# Patient Record
Sex: MALE | Race: WHITE | NOT HISPANIC OR LATINO | Employment: OTHER | URBAN - METROPOLITAN AREA
[De-identification: names, ages, dates, MRNs, and addresses within clinical notes are randomized per-mention and may not be internally consistent; named-entity substitution may affect disease eponyms.]

---

## 2017-06-09 ENCOUNTER — TELEPHONE (OUTPATIENT)
Dept: SMOKING CESSATION | Facility: CLINIC | Age: 67
End: 2017-06-09

## 2017-06-12 ENCOUNTER — TELEPHONE (OUTPATIENT)
Dept: SMOKING CESSATION | Facility: CLINIC | Age: 67
End: 2017-06-12

## 2017-06-13 ENCOUNTER — CLINICAL SUPPORT (OUTPATIENT)
Dept: SMOKING CESSATION | Facility: CLINIC | Age: 67
End: 2017-06-13
Payer: COMMERCIAL

## 2017-06-13 ENCOUNTER — TELEPHONE (OUTPATIENT)
Dept: SMOKING CESSATION | Facility: CLINIC | Age: 67
End: 2017-06-13

## 2017-06-13 DIAGNOSIS — F17.200 NICOTINE DEPENDENCE: Primary | ICD-10-CM

## 2017-06-13 PROCEDURE — 99407 BEHAV CHNG SMOKING > 10 MIN: CPT | Mod: S$GLB,,,

## 2017-06-13 NOTE — TELEPHONE ENCOUNTER
3rd attempt; 1 year telephone follow up regarding smoking cessation quit episode #1. Will resolve this episode.

## 2018-04-04 ENCOUNTER — HISTORICAL (OUTPATIENT)
Dept: CARDIOLOGY | Facility: HOSPITAL | Age: 68
End: 2018-04-04

## 2022-08-25 DIAGNOSIS — G45.9 TIA (TRANSIENT ISCHEMIC ATTACK): Primary | ICD-10-CM

## 2022-09-12 ENCOUNTER — HOSPITAL ENCOUNTER (OUTPATIENT)
Dept: CARDIOLOGY | Facility: HOSPITAL | Age: 72
Discharge: HOME | End: 2022-09-12
Payer: MEDICARE

## 2022-09-12 DIAGNOSIS — R42 DIZZINESS AND GIDDINESS: Primary | ICD-10-CM

## 2022-09-12 DIAGNOSIS — R42 DIZZINESS AND GIDDINESS: ICD-10-CM

## 2022-09-12 PROCEDURE — 93225 XTRNL ECG REC<48 HRS REC: CPT

## 2022-09-15 LAB
OHS CV EVENT MONITOR DAY: 0
OHS CV HOLTER LENGTH DECIMAL HOURS: 24
OHS CV HOLTER LENGTH HOURS: 24
OHS CV HOLTER LENGTH MINUTES: 0
OHS CV HOLTER SINUS AVERAGE HR: 65
OHS CV HOLTER SINUS MAX HR: 122
OHS CV HOLTER SINUS MIN HR: 47

## 2022-09-15 PROCEDURE — 93227 XTRNL ECG REC<48 HR R&I: CPT | Mod: ,,, | Performed by: INTERNAL MEDICINE

## 2022-09-15 PROCEDURE — 93227 HOLTER MONITOR - 24 HOUR (CUPID ONLY): ICD-10-PCS | Mod: ,,, | Performed by: INTERNAL MEDICINE

## 2022-09-29 DIAGNOSIS — I48.0 PAROXYSMAL ATRIAL FIBRILLATION: Primary | ICD-10-CM

## 2022-10-10 ENCOUNTER — HOSPITAL ENCOUNTER (OUTPATIENT)
Dept: CARDIOLOGY | Facility: HOSPITAL | Age: 72
Discharge: HOME | End: 2022-10-10
Attending: INTERNAL MEDICINE
Payer: MEDICARE

## 2022-10-10 DIAGNOSIS — I48.0 PAROXYSMAL ATRIAL FIBRILLATION: ICD-10-CM

## 2022-10-10 LAB
AV INDEX (PROSTH): 0.51
AV MEAN GRADIENT: 3 MMHG
AV PEAK GRADIENT: 7 MMHG
AV VALVE AREA: 1.76 CM2
AV VELOCITY RATIO: 0.49
CV ECHO LV RWT: 0.33 CM
DOP CALC AO PEAK VEL: 1.3 M/S
DOP CALC AO VTI: 28.6 CM
DOP CALC LVOT AREA: 3.5 CM2
DOP CALC LVOT DIAMETER: 2.1 CM
DOP CALC LVOT PEAK VEL: 0.64 M/S
DOP CALC LVOT STROKE VOLUME: 50.2 CM3
DOP CALC MV VTI: 34.5 CM
DOP CALCLVOT PEAK VEL VTI: 14.5 CM
E WAVE DECELERATION TIME: 203 MSEC
E/A RATIO: 2.14
E/E' RATIO: 8.56 M/S
ECHO LV POSTERIOR WALL: 0.88 CM (ref 0.6–1.1)
EJECTION FRACTION: 55 %
FRACTIONAL SHORTENING: 29 % (ref 28–44)
INTERVENTRICULAR SEPTUM: 1.1 CM (ref 0.6–1.1)
LEFT ATRIUM SIZE: 3 CM
LEFT ATRIUM VOLUME MOD: 67.1 CM3
LEFT INTERNAL DIMENSION IN SYSTOLE: 3.8 CM (ref 2.1–4)
LEFT VENTRICLE DIASTOLIC VOLUME: 140 ML
LEFT VENTRICLE SYSTOLIC VOLUME: 62 ML
LEFT VENTRICULAR INTERNAL DIMENSION IN DIASTOLE: 5.38 CM (ref 3.5–6)
LEFT VENTRICULAR MASS: 202.75 G
LV LATERAL E/E' RATIO: 7.7 M/S
LV SEPTAL E/E' RATIO: 9.63 M/S
LVOT MG: 1 MMHG
LVOT MV: 0.4 CM/S
MV MEAN GRADIENT: 1 MMHG
MV PEAK A VEL: 0.36 M/S
MV PEAK E VEL: 0.77 M/S
MV PEAK GRADIENT: 5 MMHG
MV STENOSIS PRESSURE HALF TIME: 76 MS
MV VALVE AREA BY CONTINUITY EQUATION: 1.45 CM2
MV VALVE AREA P 1/2 METHOD: 2.89 CM2
PV PEAK VELOCITY: 0.87 CM/S
RA PRESSURE: 3 MMHG
RIGHT VENTRICULAR END-DIASTOLIC DIMENSION: 3.2 CM
TDI LATERAL: 0.1 M/S
TDI SEPTAL: 0.08 M/S
TDI: 0.09 M/S
TRICUSPID ANNULAR PLANE SYSTOLIC EXCURSION: 2.96 CM

## 2022-10-10 PROCEDURE — 93306 ECHO (CUPID ONLY): ICD-10-PCS | Mod: 26,,, | Performed by: INTERNAL MEDICINE

## 2022-10-10 PROCEDURE — 93306 TTE W/DOPPLER COMPLETE: CPT | Mod: 26,,, | Performed by: INTERNAL MEDICINE

## 2022-10-10 PROCEDURE — 93306 TTE W/DOPPLER COMPLETE: CPT

## 2023-09-27 DIAGNOSIS — R41.89 COGNITIVE IMPAIRMENT: Primary | ICD-10-CM

## 2023-10-17 ENCOUNTER — OFFICE VISIT (OUTPATIENT)
Dept: NEUROLOGY | Facility: CLINIC | Age: 73
End: 2023-10-17
Payer: MEDICARE

## 2023-10-17 VITALS
DIASTOLIC BLOOD PRESSURE: 80 MMHG | HEIGHT: 72 IN | SYSTOLIC BLOOD PRESSURE: 126 MMHG | WEIGHT: 187 LBS | HEART RATE: 66 BPM | BODY MASS INDEX: 25.33 KG/M2

## 2023-10-17 DIAGNOSIS — G45.9 TIA (TRANSIENT ISCHEMIC ATTACK): Primary | ICD-10-CM

## 2023-10-17 DIAGNOSIS — G45.4 TRANSIENT GLOBAL AMNESIA: ICD-10-CM

## 2023-10-17 PROCEDURE — 99999 PR PBB SHADOW E&M-EST. PATIENT-LVL III: ICD-10-PCS | Mod: PBBFAC,,, | Performed by: PSYCHIATRY & NEUROLOGY

## 2023-10-17 PROCEDURE — 99205 PR OFFICE/OUTPT VISIT, NEW, LEVL V, 60-74 MIN: ICD-10-PCS | Mod: S$PBB,,, | Performed by: PSYCHIATRY & NEUROLOGY

## 2023-10-17 PROCEDURE — 99213 OFFICE O/P EST LOW 20 MIN: CPT | Mod: PBBFAC | Performed by: PSYCHIATRY & NEUROLOGY

## 2023-10-17 PROCEDURE — 99999 PR PBB SHADOW E&M-EST. PATIENT-LVL III: CPT | Mod: PBBFAC,,, | Performed by: PSYCHIATRY & NEUROLOGY

## 2023-10-17 PROCEDURE — 99205 OFFICE O/P NEW HI 60 MIN: CPT | Mod: S$PBB,,, | Performed by: PSYCHIATRY & NEUROLOGY

## 2023-10-17 RX ORDER — LOSARTAN POTASSIUM 25 MG/1
25 TABLET ORAL DAILY
COMMUNITY

## 2023-10-17 RX ORDER — FLECAINIDE ACETATE 50 MG/1
50 TABLET ORAL EVERY 12 HOURS
COMMUNITY

## 2023-10-17 RX ORDER — VILAZODONE HYDROCHLORIDE 40 MG/1
40 TABLET ORAL DAILY
COMMUNITY

## 2023-10-17 NOTE — PROGRESS NOTES
Chief Complaint   Patient presents with    Cognitive impairment     NP: Referred by Dr. Michel Jackson for neuro consult to evaluate for cognitive impaiment: About a year ago started having memory problems. Misplaces objects, forgets code to house. Forgets time loss. 3 weeks ago was crabbing in [facility]. He caught a bad headache. He walking in a daze. Police was called. Averages 5-6 hours of sleep at night. Sometimes he does get frustrated when he cannot remember. Gets spells where he cannot verbalize, but he can hear conversation last a few minutes.         This is a 73 y.o. male  here for episodic confusion.  Patient is here today with his wife.  Patient is a retired dentist.  He has had 3 separate episodes with episodic language issues or confusion.  The 1st episode occurred in September of 2020.  On this episode he was at the camp and granddaughter and had 15 minutes of difficulty with expressive language.  His symptoms resolved while on the way to the hospital.  Notably he had stopped his blood pressure medicine in his blood pressure was noted to be 210/100 at the time.  Underwent a workup and was diagnosed with possible TIA at [facility].  He was started on aspirin and Plavix at that time.  In 2021 the patient had another episode of confusion after having worked out on the treadmill and swim and then coming home.  He forgot the code to get in his house.  Once his wife helped him in his symptoms had resolved.  Around the same time he was diagnosed with paroxysmal atrial fib by Dr. Salazar.  At that time he was started on Xarelto and flecainide.  The 3rd episode occurred on September 22nd.  Patient was out of town cramping with his 11-year-old grandson.  They were staying at a hotel.  The patient left to get something across the street at the store but then became confused and was wandering around the parking lot.  EMS was called for the patient and he was very confused and disoriented.  He had forgot that he was  there with his grandson.  This lasted for several hours until 1:00 a.m. that morning patient's wife still noticed him to be confused.  He underwent an MRI of the brain which I reviewed on 09/27/2023 which showed no acute abnormality with chronic microvascular ischemic changes.        Medication List with Changes/Refills   Current Medications    AMLODIPINE (NORVASC) 10 MG TABLET    Take 10 mg by mouth once daily.    FLECAINIDE (TAMBOCOR) 50 MG TAB    Take 50 mg by mouth every 12 (twelve) hours.    LISINOPRIL (PRINIVIL,ZESTRIL) 20 MG TABLET    Take 20 mg by mouth once daily.    LOSARTAN (COZAAR) 25 MG TABLET    Take 25 mg by mouth once daily.    NICOTINE, POLACRILEX, (NICORETTE) 2 MG GUM    Take 1 each (2 mg total) by mouth as needed.    RIVAROXABAN (XARELTO) 20 MG TAB    Take 20 mg by mouth daily with dinner or evening meal.    SILDENAFIL (REVATIO) 20 MG TAB    Take 20 mg by mouth 3 (three) times daily.    TAMSULOSIN (FLOMAX) 0.4 MG CP24    Take 0.4 mg by mouth once daily.    TEMAZEPAM (RESTORIL) 15 MG CAP    Take 15 mg by mouth nightly as needed.    VARENICLINE (CHANTIX) 1 MG TAB    Take 1 tablet (1 mg total) by mouth 2 (two) times daily.    VILAZODONE (VIIBRYD) 40 MG TAB TABLET    Take 40 mg by mouth once daily.        Past Surgical History:   Procedure Laterality Date    KNEE SURGERY Left 07/2023        Past Medical History:   Diagnosis Date    Anxiety     BPH (benign prostatic hyperplasia)     Depression     Hypertension         Family History   Problem Relation Age of Onset    Heart disease Mother     Heart disease Father     No Known Problems Sister     Hyperlipidemia Brother     Hypertension Brother     Peripheral vascular disease Brother     Diabetes Maternal Grandmother     Diabetes Paternal Grandfather     No Known Problems Sister     Ulcerative colitis Son     No Known Problems Son     No Known Problems Brother     No Known Problems Brother     Suicide Brother     No Known Problems Son         Social  History     Socioeconomic History    Marital status:    Tobacco Use    Smoking status: Former     Current packs/day: 0.00     Types: Cigarettes     Quit date: 2016     Years since quittin.6   Substance and Sexual Activity    Alcohol use: Not Currently    Drug use: Never          Review of Systems  Review of Systems   Constitutional: Negative for appetite change.   HENT: Negative for sinus pressure and sore throat.    Eyes: Negative for visual disturbance.   Respiratory: Negative for cough and shortness of breath.    Cardiovascular: Negative for chest pain.   Gastrointestinal: Negative for diarrhea and nausea.   Endocrine: Negative for cold intolerance and heat intolerance.   Genitourinary: Negative for dysuria.   Musculoskeletal: Negative for arthralgias and myalgias.   Skin: Negative for rash.   Allergic/Immunologic: Negative for immunocompromised state.   Neurological:        See HPI   Hematological: Does not bruise/bleed easily.   Psychiatric/Behavioral: Negative for hallucinations.      General: alert and oriented, no acute distress, no audible wheezes, pulse intact, no edema    Vitals:    10/17/23 1034   BP: 126/80   Pulse: 66        Cognition and Comprehension  Speech and language intact  Follows commands  Speech fluent  Attention intact  Memory for recent events intact from history taking  Affect pleasant  Fund of knowledge adequate    MOCA  missed 1/5 on STM recall    Cranial nerves  II. Optic: Visual fields full to confrontation both eyes  III, IV, VI. Oculomotor: Intact, Pupils equal, round and reactive to light, no nystagmus  V. Trigeminal: sensation to light touch normal  VII. No facial asymmetry or facial weakness  VIII. Hearing intact to spoken voice  IX/X. Glossopharyngeal/Vagus: Voice normal, palate rises symmetrically  XI. Axillary: Shoulder shrug normal  XII. Hypoglossal: Intact    Muscle Strength and Tone  Normal upper extremity tone  Normal lower extremity tone  Normal upper  extremity strength  Normal lower extremity strength    Sensation  Intact to light touch and temperature    Reflexes  Normal and symmetric    Coordination and Gait  Finger to nose normal  Gait normal       Abundio was seen today for cognitive impairment.    Diagnoses and all orders for this visit:    TIA (transient ischemic attack)  -     Vitamin B12; Future  -     EEG,w/awake & asleep record; Future  -     CTA Head and Neck (xpd); Future    Transient global amnesia  -     Echo Saline Bubble? Yes; Future  -     Echo Saline Bubble? Yes     Suspect TIA in first event, dehydration in second event, TGA as third event. Memory testing ok, but will follow that going forward. Rec continue xarelto, check CTA, EEG, echo, lipid panel, vit B12

## 2023-10-23 ENCOUNTER — TELEPHONE (OUTPATIENT)
Dept: NEUROLOGY | Facility: CLINIC | Age: 73
End: 2023-10-23

## 2023-10-23 ENCOUNTER — HOSPITAL ENCOUNTER (OUTPATIENT)
Dept: RADIOLOGY | Facility: HOSPITAL | Age: 73
Discharge: HOME | End: 2023-10-23
Attending: PSYCHIATRY & NEUROLOGY
Payer: MEDICARE

## 2023-10-23 DIAGNOSIS — G45.9 TIA (TRANSIENT ISCHEMIC ATTACK): ICD-10-CM

## 2023-10-23 LAB
CREAT SERPL-MCNC: 1.2 MG/DL (ref 0.5–1.4)
SAMPLE: NORMAL

## 2023-10-23 PROCEDURE — 70496 CT ANGIOGRAPHY HEAD: CPT | Mod: TC

## 2023-10-23 PROCEDURE — 25500020 PHARM REV CODE 255: Performed by: PSYCHIATRY & NEUROLOGY

## 2023-10-23 RX ADMIN — IOPAMIDOL 100 ML: 755 INJECTION, SOLUTION INTRAVENOUS at 10:10

## 2023-10-23 NOTE — TELEPHONE ENCOUNTER
----- Message from Suzanna Gotti MD sent at 10/23/2023  3:35 PM CDT -----  Please let him know CTA is normal

## 2023-10-24 ENCOUNTER — PROCEDURE VISIT (OUTPATIENT)
Dept: SLEEP MEDICINE | Facility: HOSPITAL | Age: 73
End: 2023-10-24
Attending: PSYCHIATRY & NEUROLOGY
Payer: MEDICARE

## 2023-10-24 DIAGNOSIS — G45.9 TIA (TRANSIENT ISCHEMIC ATTACK): ICD-10-CM

## 2023-10-24 PROCEDURE — 95819 EEG AWAKE AND ASLEEP: CPT

## 2023-10-26 PROCEDURE — 95816 EEG AWAKE AND DROWSY: CPT | Mod: 26,,, | Performed by: PSYCHIATRY & NEUROLOGY

## 2023-10-26 PROCEDURE — 95816 PR EEG,W/AWAKE & DROWSY RECORD: ICD-10-PCS | Mod: 26,,, | Performed by: PSYCHIATRY & NEUROLOGY

## 2023-10-26 NOTE — PROCEDURES
Abundio Castorena is a 73 y.o. male patient.            EEG,w/awake & asleep record    Date/Time: 10/26/2023 3:56 PM    Performed by: Suzanna Gotti MD  Authorized by: Suzanna Gotti MD          10/26/2023    Reason for exam: altered mental status    Technical description:  A standard digital EEG was performed at [facility].  The 10 20 international system of electrode placement is used.  Standard montages were recorded.  Activation procedures were performed.    Description:  No posterior dominant rhythm was identified.  The record was dominated by admixed polymorphic theta and delta activity stage 2 sleep was not identified.  There were no electrographic seizures or epileptiform discharges.    Impression:  This is an abnormal EEG due to mild generalized slowing.  Generalized slowing can be seen with generalized cerebral dysfunction as seen in metabolic, toxic, infectious, or multifocal structural abnormalities.

## 2023-10-30 ENCOUNTER — TELEPHONE (OUTPATIENT)
Dept: NEUROLOGY | Facility: CLINIC | Age: 73
End: 2023-10-30
Payer: MEDICARE

## 2023-10-30 NOTE — TELEPHONE ENCOUNTER
----- Message from Suzanna Gotti MD sent at 10/30/2023  4:21 PM CDT -----  B12 is normal, EEG is normal

## 2023-11-09 ENCOUNTER — TELEPHONE (OUTPATIENT)
Dept: NEUROLOGY | Facility: CLINIC | Age: 73
End: 2023-11-09
Payer: MEDICARE

## 2023-11-09 NOTE — TELEPHONE ENCOUNTER
Please see chart review and telephone encounter from 10/30. You called him with results on 10/30 as requested by me. Labs and EEG were normal.  There is no contraindication to driving based on his diagnosis

## 2023-11-09 NOTE — TELEPHONE ENCOUNTER
Requesting results of EEG. Wants to know if it is safe for him to drive being he had a 10 hour lapse of memory. Please advise.

## 2023-11-09 NOTE — TELEPHONE ENCOUNTER
Pt states calling to discuss results of B-12 blood levels, EEG and labs.   States he is concerned about diagnosis of transient global amnesia after a spell of unconsciousness.  States this was previously reported to the team.  States seeking a callback.

## 2023-11-13 NOTE — PROGRESS NOTES
His LDL is at 145. Generally  in the setting of TIA we want this below 100. Would recommend statin medication, lipitor unless he is already taking a statin

## 2023-11-14 ENCOUNTER — TELEPHONE (OUTPATIENT)
Dept: NEUROLOGY | Facility: CLINIC | Age: 73
End: 2023-11-14
Payer: MEDICARE

## 2023-11-14 NOTE — TELEPHONE ENCOUNTER
----- Message from Brenda Lima sent at 2023  1:46 PM CST -----  Regarding: returning call  To:          Office  From:        Abundio Castorena  Phone:       [phone]  Patient:     Same  :         1950  RegDr:      Dr Suzanna Gotti  Ref:         He is returning call from  office regarding tests.    Subject:          Patient Calls  ClrID:    [id number]    --------------------------------------  Message History  Account: [id number]  Taken:  2023 12:06p [facility]  Serial#: [id number]

## 2023-11-14 NOTE — TELEPHONE ENCOUNTER
Result of lab given. States he is not a big believer in statins. States he recently started on Metamucil. He wants to know how can he do or how can he prevent from having this spell ( transient global amnesia ) ?

## 2023-11-14 NOTE — TELEPHONE ENCOUNTER
----- Message from Suzanna Gotti MD sent at 11/13/2023  4:13 PM CST -----  His LDL is at 145. Generally  in the setting of TIA we want this below 100. Would recommend statin medication, lipitor unless he is already taking a statin

## 2023-11-14 NOTE — TELEPHONE ENCOUNTER
There is not much that can be done to prevent transient global amnesia. For TIA, we recommend optimizing any stroke risk factors to prevent future TIA. From his history he had a TIA in September 2020. That would mean taking his blood pressure medication, taking a daily ASA and treating his cholesterol as suggested with the statin.

## 2023-11-14 NOTE — TELEPHONE ENCOUNTER
Patient called returning Nurse Adri phone call about his results. Requesting a call back. Please advise.

## 2024-08-19 DIAGNOSIS — I66.9 OCCLUSION AND STENOSIS OF UNSPECIFIED CEREBRAL ARTERY: Primary | ICD-10-CM

## 2024-08-21 ENCOUNTER — TELEPHONE (OUTPATIENT)
Dept: NEUROSURGERY | Facility: CLINIC | Age: 74
End: 2024-08-21
Payer: MEDICARE

## 2024-08-21 NOTE — TELEPHONE ENCOUNTER
Let the referring physician's office know that our doctors do not do surgery on vascular abnormalities.  The patient should be referred to Dr. Kelley who is a interventional neurologist.

## 2024-08-21 NOTE — TELEPHONE ENCOUNTER
Sent referral message to referring MD advising of Catie's recommendations. Closing out the referral.

## 2024-08-21 NOTE — TELEPHONE ENCOUNTER
"I received an urgent referral to Dr. Davidson from Dr. 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 By reviewing the MRI brain report, " There is no evidence of intracranial occlusive disease or acute cranial aneurysm. 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 Thank you!       STAFF: please inquire about which MD he would like to move forward with as he was referred to Dr. Davidson. Please inquire about any other testing, MD's, sx's.    "